# Patient Record
Sex: MALE | Race: WHITE | URBAN - NONMETROPOLITAN AREA
[De-identification: names, ages, dates, MRNs, and addresses within clinical notes are randomized per-mention and may not be internally consistent; named-entity substitution may affect disease eponyms.]

---

## 2018-09-04 VITALS
HEIGHT: 73 IN | DIASTOLIC BLOOD PRESSURE: 68 MMHG | SYSTOLIC BLOOD PRESSURE: 100 MMHG | HEART RATE: 84 BPM | BODY MASS INDEX: 32.87 KG/M2 | WEIGHT: 248 LBS

## 2018-09-04 PROBLEM — J45.909 ASTHMA: Status: ACTIVE | Noted: 2018-09-04

## 2018-09-04 RX ORDER — FLUTICASONE PROPIONATE 50 MCG
1 SPRAY, SUSPENSION (ML) NASAL DAILY
COMMUNITY
End: 2021-12-23 | Stop reason: SDUPTHER

## 2018-09-04 RX ORDER — BUDESONIDE AND FORMOTEROL FUMARATE DIHYDRATE 160; 4.5 UG/1; UG/1
2 AEROSOL RESPIRATORY (INHALATION) 2 TIMES DAILY
COMMUNITY
End: 2021-12-23

## 2018-09-05 ENCOUNTER — OFFICE VISIT (OUTPATIENT)
Dept: FAMILY MEDICINE CLINIC | Age: 33
End: 2018-09-05

## 2018-09-05 VITALS
SYSTOLIC BLOOD PRESSURE: 128 MMHG | WEIGHT: 268.19 LBS | HEART RATE: 90 BPM | DIASTOLIC BLOOD PRESSURE: 76 MMHG | OXYGEN SATURATION: 97 % | BODY MASS INDEX: 36.37 KG/M2

## 2018-09-05 DIAGNOSIS — Z13.1 ENCOUNTER FOR SCREENING EXAMINATION FOR IMPAIRED GLUCOSE REGULATION AND DIABETES MELLITUS: ICD-10-CM

## 2018-09-05 DIAGNOSIS — Z23 NEED FOR PNEUMOCOCCAL VACCINE: ICD-10-CM

## 2018-09-05 DIAGNOSIS — J45.41 MODERATE PERSISTENT ASTHMA WITH ACUTE EXACERBATION: Primary | ICD-10-CM

## 2018-09-05 DIAGNOSIS — Z23 NEED FOR TDAP VACCINATION: ICD-10-CM

## 2018-09-05 DIAGNOSIS — E66.09 CLASS 2 OBESITY DUE TO EXCESS CALORIES WITHOUT SERIOUS COMORBIDITY WITH BODY MASS INDEX (BMI) OF 36.0 TO 36.9 IN ADULT: ICD-10-CM

## 2018-09-05 DIAGNOSIS — Z23 NEED FOR INFLUENZA VACCINATION: ICD-10-CM

## 2018-09-05 DIAGNOSIS — Z13.220 SCREENING, LIPID: ICD-10-CM

## 2018-09-05 PROCEDURE — 90732 PPSV23 VACC 2 YRS+ SUBQ/IM: CPT | Performed by: FAMILY MEDICINE

## 2018-09-05 PROCEDURE — 90686 IIV4 VACC NO PRSV 0.5 ML IM: CPT | Performed by: FAMILY MEDICINE

## 2018-09-05 PROCEDURE — 99213 OFFICE O/P EST LOW 20 MIN: CPT | Performed by: FAMILY MEDICINE

## 2018-09-05 PROCEDURE — 90715 TDAP VACCINE 7 YRS/> IM: CPT | Performed by: FAMILY MEDICINE

## 2018-09-05 PROCEDURE — 90472 IMMUNIZATION ADMIN EACH ADD: CPT | Performed by: FAMILY MEDICINE

## 2018-09-05 PROCEDURE — 90471 IMMUNIZATION ADMIN: CPT | Performed by: FAMILY MEDICINE

## 2018-09-05 RX ORDER — BUDESONIDE AND FORMOTEROL FUMARATE DIHYDRATE 160; 4.5 UG/1; UG/1
2 AEROSOL RESPIRATORY (INHALATION) 2 TIMES DAILY
Qty: 1 INHALER | Refills: 3 | Status: SHIPPED | OUTPATIENT
Start: 2018-09-05 | End: 2022-04-07

## 2018-09-05 RX ORDER — ALBUTEROL SULFATE 90 UG/1
2 AEROSOL, METERED RESPIRATORY (INHALATION) EVERY 6 HOURS PRN
Qty: 1 INHALER | Refills: 3 | Status: SHIPPED | OUTPATIENT
Start: 2018-09-05 | End: 2021-12-23 | Stop reason: SDUPTHER

## 2018-09-05 ASSESSMENT — ENCOUNTER SYMPTOMS
RHINORRHEA: 0
SORE THROAT: 0
DIARRHEA: 0
VOMITING: 0
WHEEZING: 1
BACK PAIN: 0
SHORTNESS OF BREATH: 0
CONSTIPATION: 0
FACIAL SWELLING: 0
COUGH: 0
CHEST TIGHTNESS: 1
COUGH: 1
ABDOMINAL PAIN: 0
NAUSEA: 0

## 2018-09-05 ASSESSMENT — PATIENT HEALTH QUESTIONNAIRE - PHQ9
SUM OF ALL RESPONSES TO PHQ QUESTIONS 1-9: 0
SUM OF ALL RESPONSES TO PHQ9 QUESTIONS 1 & 2: 0
1. LITTLE INTEREST OR PLEASURE IN DOING THINGS: 0
SUM OF ALL RESPONSES TO PHQ QUESTIONS 1-9: 0
2. FEELING DOWN, DEPRESSED OR HOPELESS: 0

## 2018-09-06 LAB
CHOLESTEROL/HDL RATIO: 3.9 RATIO
CHOLESTEROL: 190 MG/DL
GLUCOSE: 95 MG/DL
HDL, DIRECT: 49 MG/DL
LDL CHOLESTEROL CALCULATED: 77.4 MG/DL
TRIGL SERPL-MCNC: 318 MG/DL
VLDLC SERPL CALC-MCNC: 64 MG/DL

## 2021-06-22 NOTE — PROGRESS NOTES
[address] [place] [address] [address]  Dept: [phone]  Dept Fax: [phone]    Kay Lucero is a 35 y.o. male who presents today for his medical conditions/complaints as noted below.   Kay Lucero is c/o of Annual Exam (astma medication refills)      HPI:     HPI    BP Readings from Last 3 Encounters:   09/05/18 128/76   01/05/15 100/68   02/19/16 151/74          (goal 120/80)    Wt Readings from Last 3 Encounters:   09/05/18 268 lb 3 oz (121.6 kg)   01/05/15 248 lb (112.5 kg)   02/19/16 238 lb (108 kg)        Past Medical History:   Diagnosis Date    Asthma     GERD (gastroesophageal reflux disease)     Headache       Past Surgical History:   Procedure Laterality Date    HERNIA REPAIR      inguinal       Family History   Problem Relation Age of Onset    Asthma Mother     Asthma Father     Cancer Father         lung       Social History   Substance Use Topics    Smoking status: Former Smoker     Years: 11.00     Types: Cigarettes     Quit date: 2/1/2013    Smokeless tobacco: Never Used    Alcohol use Yes      Comment: occasionally      Current Outpatient Prescriptions   Medication Sig Dispense Refill    budesonide-formoterol (SYMBICORT) 160-4.5 MCG/ACT AERO Inhale 2 puffs into the lungs 2 times daily 1 Inhaler 3    albuterol sulfate HFA (PROAIR HFA) 108 (90 Base) MCG/ACT inhaler Inhale 2 puffs into the lungs every 6 hours as needed for Wheezing 1 Inhaler 3    fluticasone (FLONASE) 50 MCG/ACT nasal spray 1 spray by Each Nare route daily      budesonide-formoterol (SYMBICORT) 160-4.5 MCG/ACT AERO Inhale 2 puffs into the lungs 2 times daily      sucralfate (CARAFATE) 1 GM/10ML suspension Take 10 mLs by mouth 4 times daily 1200 mL 3    albuterol sulfate HFA (PROVENTIL HFA) 108 (90 BASE) MCG/ACT inhaler Inhale 1-2 puffs into the lungs every 4 hours as needed for Wheezing or Shortness of Breath (Space out to every 6 hours as
History:   Diagnosis Date    Asthma     GERD (gastroesophageal reflux disease)     Headache       Past Surgical History:   Procedure Laterality Date    HERNIA REPAIR      inguinal       Family History   Problem Relation Age of Onset    Asthma Mother     Asthma Father     Cancer Father         lung       Social History   Substance Use Topics    Smoking status: Former Smoker     Years: 11.00     Types: Cigarettes     Quit date: 2/1/2013    Smokeless tobacco: Never Used    Alcohol use Yes      Comment: occasionally      Current Outpatient Prescriptions   Medication Sig Dispense Refill    budesonide-formoterol (SYMBICORT) 160-4.5 MCG/ACT AERO Inhale 2 puffs into the lungs 2 times daily 1 Inhaler 3    albuterol sulfate HFA (PROAIR HFA) 108 (90 Base) MCG/ACT inhaler Inhale 2 puffs into the lungs every 6 hours as needed for Wheezing 1 Inhaler 3    fluticasone (FLONASE) 50 MCG/ACT nasal spray 1 spray by Each Nare route daily      budesonide-formoterol (SYMBICORT) 160-4.5 MCG/ACT AERO Inhale 2 puffs into the lungs 2 times daily      sucralfate (CARAFATE) 1 GM/10ML suspension Take 10 mLs by mouth 4 times daily 1200 mL 3    albuterol sulfate HFA (PROVENTIL HFA) 108 (90 BASE) MCG/ACT inhaler Inhale 1-2 puffs into the lungs every 4 hours as needed for Wheezing or Shortness of Breath (Space out to every 6 hours as symptoms improve) Space out to every 6 hours as symptoms improve. 1 Inhaler 0     No current facility-administered medications for this visit. No Known Allergies    Health Maintenance   Topic Date Due    HIV screen  04/16/2000    DTaP/Tdap/Td vaccine (2 - Td) 09/05/2028    Flu vaccine  Completed    Pneumococcal med risk  Completed       Subjective:      Review of Systems   Constitutional: Positive for fatigue. Negative for appetite change, chills and fever. HENT: Negative for ear pain, facial swelling, hearing loss, rhinorrhea, sneezing and sore throat.     Respiratory: Positive for chest
for Wheezing or Shortness of Breath (Space out to every 6 hours as symptoms improve) Space out to every 6 hours as symptoms improve. 1 Inhaler 0     No current facility-administered medications for this visit. No Known Allergies    Health Maintenance   Topic Date Due    HIV screen  04/16/2000    DTaP/Tdap/Td vaccine (1 - Tdap) 04/16/2004    Pneumococcal med risk (1 of 1 - PPSV23) 04/16/2004    Flu vaccine (1) 09/01/2018       Subjective:      Review of Systems   Respiratory: Positive for cough and wheezing (always). Respiratory: Positive for wheezing ( little bit). Negative for cough and shortness of breath. Cardiovascular: Negative for chest pain, palpitations and leg swelling. Gastrointestinal: Negative for abdominal pain, constipation and diarrhea. Genitourinary: Negative for frequency and urgency. Neurological: Negative for dizziness and headaches. Objective:     /76   Pulse 90   Wt 268 lb 3 oz (121.6 kg)   SpO2 97%   BMI 36.37 kg/m²     Physical Exam    Assessment/Plan:     {No diagnosis found. (Refresh or delete this SmartLink)}      No results found for: WBC, HGB, HCT, PLT, CHOL, TRIG, HDL, LDLDIRECT, ALT, AST, NA, K, CL, CREATININE, BUN, CO2, TSH, PSA, INR, GLUF, LABA1C, LABMICR    No Follow-up on file. Patient given educational materials - see patient instructions. Discussed use, benefit, and side effects of prescribed medications. All patient questions answered. Pt voiced understanding. Reviewed health maintenance. Instructed to continue current medications, diet and exercise. Patient agreed with treatment plan. Follow up as directed.      Electronically signed by Esperanza Montelongo MD on 9/5/2018
There are no Wet Read(s) to document.

## 2021-12-23 ENCOUNTER — E-VISIT (OUTPATIENT)
Dept: OTHER | Facility: CLINIC | Age: 36
End: 2021-12-23

## 2021-12-23 ENCOUNTER — E-VISIT (OUTPATIENT)
Dept: OTHER | Facility: CLINIC | Age: 36
End: 2021-12-23
Payer: COMMERCIAL

## 2021-12-23 DIAGNOSIS — J45.41 MODERATE PERSISTENT ASTHMA WITH ACUTE EXACERBATION: ICD-10-CM

## 2021-12-23 DIAGNOSIS — J30.1 SEASONAL ALLERGIC RHINITIS DUE TO POLLEN: Primary | ICD-10-CM

## 2021-12-23 PROCEDURE — 99421 OL DIG E/M SVC 5-10 MIN: CPT | Performed by: FAMILY MEDICINE

## 2021-12-23 RX ORDER — ALBUTEROL SULFATE 90 UG/1
2 AEROSOL, METERED RESPIRATORY (INHALATION) EVERY 6 HOURS PRN
Qty: 1 EACH | Refills: 5 | Status: SHIPPED | OUTPATIENT
Start: 2021-12-23 | End: 2022-04-02 | Stop reason: ALTCHOICE

## 2021-12-23 RX ORDER — FLUTICASONE PROPIONATE 50 MCG
1 SPRAY, SUSPENSION (ML) NASAL DAILY
Qty: 16 G | Refills: 5 | Status: SHIPPED | OUTPATIENT
Start: 2021-12-23

## 2022-04-01 RX ORDER — ALBUTEROL SULFATE 90 UG/1
2 AEROSOL, METERED RESPIRATORY (INHALATION) EVERY 6 HOURS PRN
Qty: 1 EACH | Refills: 5 | Status: CANCELLED | OUTPATIENT
Start: 2022-04-01

## 2022-04-01 RX ORDER — FLUTICASONE PROPIONATE 50 MCG
1 SPRAY, SUSPENSION (ML) NASAL DAILY
Qty: 16 G | Refills: 5 | Status: CANCELLED | OUTPATIENT
Start: 2022-04-01

## 2022-04-02 DIAGNOSIS — J45.20 MILD INTERMITTENT ASTHMA WITHOUT COMPLICATION: Primary | ICD-10-CM

## 2022-04-07 ENCOUNTER — OFFICE VISIT (OUTPATIENT)
Dept: FAMILY MEDICINE CLINIC | Age: 37
End: 2022-04-07
Payer: COMMERCIAL

## 2022-04-07 VITALS
DIASTOLIC BLOOD PRESSURE: 72 MMHG | HEART RATE: 88 BPM | BODY MASS INDEX: 37.72 KG/M2 | SYSTOLIC BLOOD PRESSURE: 112 MMHG | OXYGEN SATURATION: 97 % | WEIGHT: 269.4 LBS | HEIGHT: 71 IN | RESPIRATION RATE: 20 BRPM

## 2022-04-07 DIAGNOSIS — L85.3 DRY SKIN DERMATITIS: ICD-10-CM

## 2022-04-07 DIAGNOSIS — K52.9 ACUTE GASTROENTERITIS: ICD-10-CM

## 2022-04-07 DIAGNOSIS — J45.41 MODERATE PERSISTENT ASTHMA WITH ACUTE EXACERBATION: Primary | ICD-10-CM

## 2022-04-07 PROCEDURE — 99214 OFFICE O/P EST MOD 30 MIN: CPT | Performed by: FAMILY MEDICINE

## 2022-04-07 RX ORDER — BUDESONIDE AND FORMOTEROL FUMARATE DIHYDRATE 160; 4.5 UG/1; UG/1
2 AEROSOL RESPIRATORY (INHALATION) 2 TIMES DAILY
Qty: 1 EACH | Refills: 5 | Status: SHIPPED | OUTPATIENT
Start: 2022-04-07

## 2022-04-07 RX ORDER — AZITHROMYCIN 500 MG/1
500 TABLET, FILM COATED ORAL DAILY
Qty: 3 TABLET | Refills: 0 | Status: SHIPPED | OUTPATIENT
Start: 2022-04-07 | End: 2022-04-10

## 2022-04-07 SDOH — ECONOMIC STABILITY: FOOD INSECURITY: WITHIN THE PAST 12 MONTHS, THE FOOD YOU BOUGHT JUST DIDN'T LAST AND YOU DIDN'T HAVE MONEY TO GET MORE.: NEVER TRUE

## 2022-04-07 SDOH — ECONOMIC STABILITY: FOOD INSECURITY: WITHIN THE PAST 12 MONTHS, YOU WORRIED THAT YOUR FOOD WOULD RUN OUT BEFORE YOU GOT MONEY TO BUY MORE.: NEVER TRUE

## 2022-04-07 ASSESSMENT — ENCOUNTER SYMPTOMS
DIARRHEA: 1
BLOATING: 1
ABDOMINAL PAIN: 0
VOMITING: 0
FLATUS: 1
COUGH: 0

## 2022-04-07 ASSESSMENT — PATIENT HEALTH QUESTIONNAIRE - PHQ9
4. FEELING TIRED OR HAVING LITTLE ENERGY: 0
9. THOUGHTS THAT YOU WOULD BE BETTER OFF DEAD, OR OF HURTING YOURSELF: 0
SUM OF ALL RESPONSES TO PHQ QUESTIONS 1-9: 5
8. MOVING OR SPEAKING SO SLOWLY THAT OTHER PEOPLE COULD HAVE NOTICED. OR THE OPPOSITE, BEING SO FIGETY OR RESTLESS THAT YOU HAVE BEEN MOVING AROUND A LOT MORE THAN USUAL: 1
2. FEELING DOWN, DEPRESSED OR HOPELESS: 2
5. POOR APPETITE OR OVEREATING: 0
6. FEELING BAD ABOUT YOURSELF - OR THAT YOU ARE A FAILURE OR HAVE LET YOURSELF OR YOUR FAMILY DOWN: 0
3. TROUBLE FALLING OR STAYING ASLEEP: 0
SUM OF ALL RESPONSES TO PHQ QUESTIONS 1-9: 5
10. IF YOU CHECKED OFF ANY PROBLEMS, HOW DIFFICULT HAVE THESE PROBLEMS MADE IT FOR YOU TO DO YOUR WORK, TAKE CARE OF THINGS AT HOME, OR GET ALONG WITH OTHER PEOPLE: 0
1. LITTLE INTEREST OR PLEASURE IN DOING THINGS: 2
7. TROUBLE CONCENTRATING ON THINGS, SUCH AS READING THE NEWSPAPER OR WATCHING TELEVISION: 0
SUM OF ALL RESPONSES TO PHQ9 QUESTIONS 1 & 2: 4
SUM OF ALL RESPONSES TO PHQ QUESTIONS 1-9: 5
SUM OF ALL RESPONSES TO PHQ QUESTIONS 1-9: 5

## 2022-04-07 ASSESSMENT — SOCIAL DETERMINANTS OF HEALTH (SDOH): HOW HARD IS IT FOR YOU TO PAY FOR THE VERY BASICS LIKE FOOD, HOUSING, MEDICAL CARE, AND HEATING?: NOT HARD AT ALL

## 2022-04-07 NOTE — PATIENT INSTRUCTIONS
Can try the fexofenadine instead of the loratadine, cetirizine if needed for an antihistamine for allergy symptoms. Start on a daily probiotic and pepto bismal as needed for the loose stools. Give this over the weekend and if not improving then I would do a short course of an antibiotic that targets the number one causes of bacterial GI infections. May try the probiotic such as Idell Sears Quadra 106, or Florastor or similar multi-strain probiotic blend for improving the GI symptoms.

## 2022-04-07 NOTE — PROGRESS NOTES
[address] [facility]  [address]  Dept: [phone]  Dept [facility]:[phone]    Neha Pennington is a 39 y.o. male who presents today for his medical conditions/complaints as notedbelow. Neha Pennington is c/o of Rash (Rough skin with taking Claritin/Zyrtec. He stopped taking medication and skin cleared. Diarrhea for 3 weeks. 8-15 loose (looks like mud) stools per day. He has had 5 today so far. He is a . )      HPI:     Started with URI/ Sinus thing then went into a stomach thing with the diarrhea. Only has taken the antidiarrhea medications. Thought related maybe to poor diet but has really tried to eat better and has not made any difference. No significant weight change. 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 Not really doing any restaurant food if he can help it. Is drinking mostly water to drink. No pepsi at all in the last few years. Diarrhea   This is a new problem. The current episode started 1 to 4 weeks ago (3 weeks ago). The problem occurs 5 to 10 times per day. The stool consistency is described as mucous (looks like mud). Associated symptoms include bloating, increased flatus and a URI. Pertinent negatives include no abdominal pain, arthralgias, chills, coughing, fever, headaches, myalgias, sweats, vomiting or weight loss. Nothing (no change with what he eats ro drinks, stopped dairy and no change, stopped fast food.  ) aggravates the symptoms. There are no known risk factors. He has tried anti-motility drug and bismuth subsalicylate for the symptoms. The treatment provided mild relief. Has continued to use an albuterol inhaler monthly since he had COVID about  Months ago. Has had persistent problems since that time. Cough has also resolved with the nasal steroid and the saline. Is hearing the wheezing several times per day and then he knows he needs his inhaler.   No cough or mucous production since he has been taking his flonse. No CP, has the SOB. The inhaler takes the SOB away. Has stopped the vaping completely in the last 4 years. Was starting to get some red bumps on the sides of his abd when he was using the antihistamine. Would have significant episodes of skin feeling like it is on fire. On the back of his arms, on the backs of his legs and along the sides. BP Readings from Last 3 Encounters:   22 112/72   18 128/76   01/05/15 100/68          (goal 120/80)    Wt Readings from Last 3 Encounters:   22 269 lb 6.4 oz (122.2 kg)   18 268 lb 3 oz (121.6 kg)   01/05/15 248 lb (112.5 kg)        Past Medical History:   Diagnosis Date    Asthma     GERD (gastroesophageal reflux disease)     Headache       Past Surgical History:   Procedure Laterality Date    HERNIA REPAIR      inguinal       Family History   Problem Relation Age of Onset    Asthma Mother     Asthma Father     Cancer Father         lung       Social History     Tobacco Use    Smoking status: Former Smoker     Years: 11.00     Types: Cigarettes     Quit date: 2013     Years since quittin.1    Smokeless tobacco: Never Used   Substance Use Topics    Alcohol use: Yes     Comment: occasionally      Current Outpatient Medications   Medication Sig Dispense Refill    budesonide-formoterol (SYMBICORT) 160-4.5 MCG/ACT AERO Inhale 2 puffs into the lungs 2 times daily 1 each 5    azithromycin (ZITHROMAX) 500 MG tablet Take 1 tablet by mouth daily for 3 days 3 tablet 0    VENTOLIN  (90 Base) MCG/ACT inhaler Inhale 2 puffs into the lungs every 4 hours as needed for Wheezing or Shortness of Breath 18 g 5    fluticasone (FLONASE) 50 MCG/ACT nasal spray 1 spray by Each Nostril route daily 16 g 5     No current facility-administered medications for this visit.      Allergies   Allergen Reactions    Claritin [Loratadine] Rash    Zyrtec [Cetirizine] Rash       Health Maintenance   Topic Date Due    Hepatitis C screen  Never done    Varicella vaccine (1 of 2 - 2-dose childhood series) Never done    COVID-19 Vaccine (1) Never done    HIV screen  Never done    Diabetes screen  Never done    Flu vaccine (Season Ended) 09/01/2022    Depression Screen  04/07/2023    DTaP/Tdap/Td vaccine (2 - Td or Tdap) 09/05/2028    Pneumococcal 0-64 years Vaccine (2 of 2 - PPSV23) 04/16/2050    Hepatitis A vaccine  Aged Out    Hepatitis B vaccine  Aged Out    Hib vaccine  Aged Out    Meningococcal (ACWY) vaccine  Aged Out       Subjective:      Review of Systems   Constitutional: Negative for chills, fever and weight loss. Respiratory: Negative for cough. Gastrointestinal: Positive for bloating, diarrhea and flatus. Negative for abdominal pain and vomiting. Musculoskeletal: Negative for arthralgias and myalgias. Neurological: Negative for headaches. Objective:     /72 (Site: Right Upper Arm, Position: Sitting, Cuff Size: Large Adult)   Pulse 88   Resp 20   Ht 5' 11.46\" (1.815 m)   Wt 269 lb 6.4 oz (122.2 kg)   SpO2 97%   BMI 37.09 kg/m²     Physical Exam  Vitals and nursing note reviewed. Constitutional:       Appearance: Normal appearance. He is well-developed. HENT:      Head: Normocephalic and atraumatic. Eyes:      Conjunctiva/sclera: Conjunctivae normal.   Neck:      Thyroid: No thyromegaly. Vascular: No JVD. Cardiovascular:      Rate and Rhythm: Normal rate and regular rhythm. Heart sounds: Normal heart sounds. No murmur heard. No friction rub. No gallop. Pulmonary:      Effort: Pulmonary effort is normal. No respiratory distress. Breath sounds: Normal breath sounds. No wheezing or rales. Abdominal:      Palpations: Abdomen is soft. There is no mass. Tenderness: There is no abdominal tenderness. There is no right CVA tenderness or left CVA tenderness. Musculoskeletal:      Cervical back: Normal range of motion and neck supple.       Right lower leg: No edema. Left lower leg: No edema. Lymphadenopathy:      Cervical: No cervical adenopathy. Skin:     General: Skin is warm. Findings: Rash (Diffuse rough areas on the abdomen and along the posterior flanks. Some roughness in the extensor surfaces of the arm as well. No discrete rash.) present. Neurological:      Mental Status: He is alert and oriented to person, place, and time. Mental status is at baseline. Psychiatric:         Mood and Affect: Mood normal.         Behavior: Behavior normal.         Thought Content: Thought content normal.         Judgment: Judgment normal.         Assessment/Plan:     1. Moderate persistent asthma with acute exacerbation  -     budesonide-formoterol (SYMBICORT) 160-4.5 MCG/ACT AERO; Inhale 2 puffs into the lungs 2 times daily, Disp-1 each, R-5Normal  2. Acute gastroenteritis  -     azithromycin (ZITHROMAX) 500 MG tablet; Take 1 tablet by mouth daily for 3 days, Disp-3 tablet, R-0Print  3. Dry skin dermatitis    Can try the fexofenadine instead of the loratadine, cetirizine if needed for an antihistamine for allergy symptoms. Make sure using a moisturizing body wash. Use daily moisturizer out of the shower. May have had an allergic type reaction to the antihistamine but may simply have exacerbated his already dry skin. Start on a daily probiotic and pepto bismal as needed for the loose stools. Give this over the weekend and if not improving then I would do a short course of an antibiotic that targets the number one causes of bacterial GI infections. May try the probiotic such as Malik Ditia Quadra 106, or Florastor or similar multi-strain probiotic blend for improving the GI symptoms. Lab Results   Component Value Date    CHOL 190 09/06/2018    TRIG 318 (H) 09/06/2018       Return in about 6 months (around 10/7/2022) for Medication recheck. --Follow-up sooner if rash recurs or if new symptoms related to his asthma.       Multiple labs and other testing may have been ordered which may not be completely evident from the above note due to system interface incompatibilities. Patient given educational materials - see patientinstructions. Discussed use, benefit, and side effects of prescribed medications. All patient questions answered. Pt voiced understanding. Reviewed health maintenance. Instructed to continue current medications, diet andexercise. Patient agreed with treatment plan. Follow up as directed.      (Please note that portions of this note were completed with a voice-recognition program. Efforts were made to edit the dictation but occasionally words are mis-transcribed.)    Electronically signed by Edson Baeza MD on 4/10/2022

## 2022-04-18 ENCOUNTER — TELEPHONE (OUTPATIENT)
Dept: FAMILY MEDICINE CLINIC | Age: 37
End: 2022-04-18

## 2022-04-18 DIAGNOSIS — A09 INFECTIOUS GASTROENTERITIS: Primary | ICD-10-CM

## 2022-04-18 RX ORDER — AZITHROMYCIN 500 MG/1
500 TABLET, FILM COATED ORAL DAILY
Qty: 3 TABLET | Refills: 0 | Status: SHIPPED | OUTPATIENT
Start: 2022-04-18 | End: 2022-04-21

## 2022-04-18 NOTE — TELEPHONE ENCOUNTER
Pt notified by phone, states he was given a written script for abx at his last appt and he did end up getting that filled and did that for 3 days. He is not going as often but still very watery when does go. He is still taking the probiotic daily and taking pepto bismol daily.  Notified if things worsen or change to let us know for further testing

## 2022-04-18 NOTE — TELEPHONE ENCOUNTER
Yazan Veliz MD 3 days ago       I wanted to message you about francisco . He is now having mucous in his diarrhea a few times a day and Im getting worried with how much he is going to the bathroom. If by Monday he isnt seeing improvement can you order some tests or something so we can figure out what is going on? Im really worried about him.

## 2022-04-18 NOTE — TELEPHONE ENCOUNTER
At our last visit we discussed his ongoing diarrhea for several weeks. We discussed that if his diarrhea was not getting better at that point after about 5 to 6 days that he was to let us know and we would do an empiric treatment for infectious gastroenteritis with an antibiotic. I have not heard from him until the message that came into his wife's chart. I will send in a prescription for the azithromycin as we discussed this is a 3-day course. Give out at 4-week to see improvement. The mucus itself does not alarm me but blood work. She did see a gradual decrease in the number stools. Continue taking the probiotic daily. If he continues to have many watery stools daily and then I would recommend stool studies and we would proceed from there. I would also recommend an abdominal x-ray at that time. It may take several weeks for his bowels to get back to a normal pattern.

## 2022-05-09 ENCOUNTER — OFFICE VISIT (OUTPATIENT)
Dept: FAMILY MEDICINE CLINIC | Age: 37
End: 2022-05-09
Payer: COMMERCIAL

## 2022-05-09 VITALS
DIASTOLIC BLOOD PRESSURE: 86 MMHG | BODY MASS INDEX: 37.66 KG/M2 | WEIGHT: 269 LBS | HEIGHT: 71 IN | OXYGEN SATURATION: 97 % | SYSTOLIC BLOOD PRESSURE: 120 MMHG | HEART RATE: 86 BPM

## 2022-05-09 DIAGNOSIS — A09 INFECTIOUS GASTROENTERITIS: Primary | ICD-10-CM

## 2022-05-09 PROCEDURE — 99213 OFFICE O/P EST LOW 20 MIN: CPT | Performed by: FAMILY MEDICINE

## 2022-05-09 NOTE — PROGRESS NOTES
[address] [facility]  [address]  Dept: [phone]  Dept [facility]:[phone]    Serenity Flores is a 40 y.o. male who presents today for his medical conditions/complaints as notedbelow. Serenity Flores is c/o of Diarrhea (1 mo f/u)      HPI:     TREASURE [place] started having loose stools in mid March. So sated at that time with a URI. He was treated with probiotics and then a short course of azithromycin for presumed infectious diarrhea. He is better-- Is using the bathroom 3-4 times per day rather than 10 like he was initially. Consistency is still soft between a 5-6 on the stool scale-- no tenderness in the colon any more. Still has some urgency. But not nearly as frequent. Has tried increasing the fiber in his diet, sweet potatoes, wheat bread. If he keeps his calories lower he does better. Has cut out most fast food a few years ago.       BP Readings from Last 3 Encounters:   22 120/86   22 112/72   18 128/76          (goal 120/80)    Wt Readings from Last 3 Encounters:   22 269 lb (122 kg)   22 269 lb 6.4 oz (122.2 kg)   18 268 lb 3 oz (121.6 kg)        Past Medical History:   Diagnosis Date    Asthma     GERD (gastroesophageal reflux disease)     Headache       Past Surgical History:   Procedure Laterality Date    HERNIA REPAIR      inguinal       Family History   Problem Relation Age of Onset    Asthma Mother     Asthma Father     Cancer Father         lung       Social History     Tobacco Use    Smoking status: Former Smoker     Years: 11.00     Types: Cigarettes     Quit date: 2013     Years since quittin.2    Smokeless tobacco: Never Used   Substance Use Topics    Alcohol use: Yes     Comment: occasionally      Current Outpatient Medications   Medication Sig Dispense Refill    Probiotic Product ("[employer] PO) Take 1 capsule by mouth daily      Probiotic Product (PROBIOTIC-10 PO) Take 1 capsule by mouth daily      budesonide-formoterol (SYMBICORT) 160-4.5 MCG/ACT AERO Inhale 2 puffs into the lungs 2 times daily 1 each 5    VENTOLIN  (90 Base) MCG/ACT inhaler Inhale 2 puffs into the lungs every 4 hours as needed for Wheezing or Shortness of Breath 18 g 5    fluticasone (FLONASE) 50 MCG/ACT nasal spray 1 spray by Each Nostril route daily 16 g 5     No current facility-administered medications for this visit. Allergies   Allergen Reactions    Claritin [Loratadine] Rash    Zyrtec [Cetirizine] Rash       Health Maintenance   Topic Date Due    Varicella vaccine (1 of 2 - 2-dose childhood series) Never done    COVID-19 Vaccine (1) Never done    HIV screen  Never done    Hepatitis C screen  Never done    Pneumococcal 0-64 years Vaccine (2 - PCV) 09/05/2019    Diabetes screen  Never done    Flu vaccine (Season Ended) 09/01/2022    Depression Screen  04/07/2023    DTaP/Tdap/Td vaccine (2 - Td or Tdap) 09/05/2028    Hepatitis A vaccine  Aged Out    Hepatitis B vaccine  Aged Out    Hib vaccine  Aged Out    Meningococcal (ACWY) vaccine  Aged Out       Subjective:      Review of Systems    Objective:     /86 (Site: Right Upper Arm, Position: Sitting, Cuff Size: Large Adult)   Pulse 86   Ht 5' 11\" (1.803 m)   Wt 269 lb (122 kg)   SpO2 97%   BMI 37.52 kg/m²     Physical Exam  Vitals and nursing note reviewed. Constitutional:       Appearance: Normal appearance. He is well-developed. HENT:      Head: Normocephalic and atraumatic. Eyes:      Conjunctiva/sclera: Conjunctivae normal.   Neck:      Thyroid: No thyromegaly. Vascular: No JVD. Cardiovascular:      Rate and Rhythm: Normal rate and regular rhythm. Heart sounds: Normal heart sounds. No murmur heard. No friction rub. No gallop. Pulmonary:      Effort: Pulmonary effort is normal. No respiratory distress. Breath sounds: Normal breath sounds.    Abdominal:      Palpations: Abdomen is soft. There is no mass. Tenderness: There is no abdominal tenderness. Musculoskeletal:      Cervical back: Normal range of motion and neck supple. Right lower leg: No edema. Left lower leg: No edema. Lymphadenopathy:      Cervical: No cervical adenopathy. Skin:     General: Skin is warm. Neurological:      General: No focal deficit present. Mental Status: He is alert and oriented to person, place, and time. Assessment/Plan:     1. Infectious gastroenteritis      Try holding the dairy for 2 weeks and see how this affects the bowels. Gradually add in the fiber supplement as well. If not improving with check the XR of the abd. and consider celiac titers if this is not helping then would consider adding in cholesteramine daily for the bowels. Start a psyllium supplement daily such as metamucil. Start slowly with a tablespoon of the regular once daily (1 tsp if you use the sugar free) and then in 2 weeks increase to 2 tbs and up to twice daily if needed to regulate. Lab Results   Component Value Date    CHOL 190 09/06/2018    TRIG 318 (H) 09/06/2018       Return if symptoms worsen or fail to improve. Multiple labs and other testing may have been ordered which may not be completely evident from the above note due to system interface incompatibilities. Patient given educational materials - see patientinstructions. Discussed use, benefit, and side effects of prescribed medications. All patient questions answered. Pt voiced understanding. Reviewed health maintenance. Instructed to continue current medications, diet andexercise. Patient agreed with treatment plan. Follow up as directed.      (Please note that portions of this note were completed with a voice-recognition program. Efforts were made to edit the dictation but occasionally words are mis-transcribed.)    Electronically signed by Jina Cardoso MD on 5/15/2022

## 2022-05-09 NOTE — PATIENT INSTRUCTIONS
Start a psyllium supplement daily such as metamucil.  Start slowly with a tablespoon of the regular once daily (1 tsp if you use the sugar free) and then in 2 weeks increase to 2 tbs and up to twice daily if needed to regulate

## 2023-03-21 DIAGNOSIS — J45.20 MILD INTERMITTENT ASTHMA WITHOUT COMPLICATION: ICD-10-CM

## 2023-03-22 DIAGNOSIS — J45.20 MILD INTERMITTENT ASTHMA WITHOUT COMPLICATION: ICD-10-CM

## 2023-03-22 DIAGNOSIS — J45.41 MODERATE PERSISTENT ASTHMA WITH ACUTE EXACERBATION: ICD-10-CM

## 2023-03-22 RX ORDER — ALBUTEROL SULFATE 90 UG/1
AEROSOL, METERED RESPIRATORY (INHALATION)
Qty: 8.5 G | Refills: 0 | Status: SHIPPED | OUTPATIENT
Start: 2023-03-22 | End: 2023-03-22 | Stop reason: SDUPTHER

## 2023-03-22 RX ORDER — ALBUTEROL SULFATE 90 UG/1
2 AEROSOL, METERED RESPIRATORY (INHALATION) EVERY 6 HOURS PRN
Qty: 8.5 G | Refills: 3 | Status: SHIPPED | OUTPATIENT
Start: 2023-03-22

## 2023-03-22 RX ORDER — ALBUTEROL SULFATE 90 UG/1
2 AEROSOL, METERED RESPIRATORY (INHALATION) EVERY 4 HOURS PRN
Qty: 18 G | Refills: 5 | OUTPATIENT
Start: 2023-03-22

## 2023-03-22 RX ORDER — BUDESONIDE AND FORMOTEROL FUMARATE DIHYDRATE 160; 4.5 UG/1; UG/1
2 AEROSOL RESPIRATORY (INHALATION) 2 TIMES DAILY
Qty: 1 EACH | Refills: 5 | Status: SHIPPED | OUTPATIENT
Start: 2023-03-22

## 2023-03-22 NOTE — TELEPHONE ENCOUNTER
Tanika Heath is requesting a refill on the following medication(s):  Requested Prescriptions     Pending Prescriptions Disp Refills    VENTOLIN  (90 Base) MCG/ACT inhaler 18 g 5     Sig: Inhale 2 puffs into the lungs every 4 hours as needed for Wheezing or Shortness of Breath       Last Visit Date (If Applicable):  4/7/9547    Next Visit Date:    Visit date not found

## 2023-03-22 NOTE — TELEPHONE ENCOUNTER
Alyssa Benavides is requesting a refill on the following medication(s):  Requested Prescriptions     Pending Prescriptions Disp Refills    budesonide-formoterol (SYMBICORT) 160-4.5 MCG/ACT AERO 1 each 5     Sig: Inhale 2 puffs into the lungs 2 times daily       Last Visit Date (If Applicable):  2/6/5011    Next Visit Date:    3/22/2023

## 2023-07-14 ENCOUNTER — OFFICE VISIT (OUTPATIENT)
Dept: FAMILY MEDICINE CLINIC | Age: 38
End: 2023-07-14
Payer: COMMERCIAL

## 2023-07-14 VITALS
DIASTOLIC BLOOD PRESSURE: 74 MMHG | SYSTOLIC BLOOD PRESSURE: 104 MMHG | HEIGHT: 71 IN | HEART RATE: 70 BPM | BODY MASS INDEX: 33.12 KG/M2 | OXYGEN SATURATION: 97 % | WEIGHT: 236.6 LBS

## 2023-07-14 DIAGNOSIS — Z13.220 ENCOUNTER FOR LIPID SCREENING FOR CARDIOVASCULAR DISEASE: ICD-10-CM

## 2023-07-14 DIAGNOSIS — Z13.6 ENCOUNTER FOR LIPID SCREENING FOR CARDIOVASCULAR DISEASE: ICD-10-CM

## 2023-07-14 DIAGNOSIS — J45.20 MILD INTERMITTENT ASTHMA WITHOUT COMPLICATION: ICD-10-CM

## 2023-07-14 DIAGNOSIS — J45.41 MODERATE PERSISTENT ASTHMA WITH ACUTE EXACERBATION: ICD-10-CM

## 2023-07-14 DIAGNOSIS — Z13.1 SCREENING FOR DIABETES MELLITUS: ICD-10-CM

## 2023-07-14 DIAGNOSIS — Z00.00 WELLNESS EXAMINATION: Primary | ICD-10-CM

## 2023-07-14 LAB
CHOLESTEROL/HDL RATIO: 3.78 RATIO (ref 0–4.5)
CHOLESTEROL: 185 MG/DL (ref 50–200)
GLUCOSE: 86 MG/DL (ref 75–110)
HDLC SERPL-MCNC: 49 MG/DL (ref 36–68)
LDL CHOLESTEROL CALCULATED: 109 MG/DL (ref 0–160)
TRIGL SERPL-MCNC: 135 MG/DL (ref 10–250)
VLDLC SERPL CALC-MCNC: 27 MG/DL (ref 0–50)

## 2023-07-14 PROCEDURE — 99395 PREV VISIT EST AGE 18-39: CPT | Performed by: FAMILY MEDICINE

## 2023-07-14 RX ORDER — ALBUTEROL SULFATE 90 UG/1
2 AEROSOL, METERED RESPIRATORY (INHALATION) EVERY 6 HOURS PRN
Qty: 8.5 G | Refills: 3 | OUTPATIENT
Start: 2023-07-14

## 2023-07-14 RX ORDER — ALBUTEROL SULFATE 90 UG/1
2 AEROSOL, METERED RESPIRATORY (INHALATION) EVERY 6 HOURS PRN
Qty: 8.5 G | Refills: 3 | Status: SHIPPED | OUTPATIENT
Start: 2023-07-14

## 2023-07-14 RX ORDER — ALBUTEROL SULFATE 90 UG/1
2 AEROSOL, METERED RESPIRATORY (INHALATION) EVERY 6 HOURS PRN
Qty: 8.5 G | Refills: 3 | Status: SHIPPED | OUTPATIENT
Start: 2023-07-14 | End: 2023-07-14 | Stop reason: SDUPTHER

## 2023-07-14 RX ORDER — BUDESONIDE AND FORMOTEROL FUMARATE DIHYDRATE 160; 4.5 UG/1; UG/1
2 AEROSOL RESPIRATORY (INHALATION) 2 TIMES DAILY
Qty: 1 EACH | Refills: 5 | Status: SHIPPED | OUTPATIENT
Start: 2023-07-14

## 2023-07-14 ASSESSMENT — PATIENT HEALTH QUESTIONNAIRE - PHQ9
SUM OF ALL RESPONSES TO PHQ QUESTIONS 1-9: 0
2. FEELING DOWN, DEPRESSED OR HOPELESS: 0
SUM OF ALL RESPONSES TO PHQ QUESTIONS 1-9: 0
SUM OF ALL RESPONSES TO PHQ QUESTIONS 1-9: 0
SUM OF ALL RESPONSES TO PHQ9 QUESTIONS 1 & 2: 0
SUM OF ALL RESPONSES TO PHQ QUESTIONS 1-9: 0
1. LITTLE INTEREST OR PLEASURE IN DOING THINGS: 0

## 2023-07-14 NOTE — TELEPHONE ENCOUNTER
Mireya Doan is calling to request a refill on the following medication(s):  Requested Prescriptions     Pending Prescriptions Disp Refills    budesonide-formoterol (SYMBICORT) 160-4.5 MCG/ACT AERO 1 each 5     Sig: Inhale 2 puffs into the lungs 2 times daily     Refused Prescriptions Disp Refills    albuterol sulfate HFA (PROVENTIL;VENTOLIN;PROAIR) 108 (90 Base) MCG/ACT inhaler 8.5 g 3     Sig: Inhale 2 puffs into the lungs every 6 hours as needed for Wheezing       Last Visit Date (If Applicable):  1/81/3114    Next Visit Date:    Visit date not found

## 2024-07-18 SDOH — ECONOMIC STABILITY: INCOME INSECURITY: HOW HARD IS IT FOR YOU TO PAY FOR THE VERY BASICS LIKE FOOD, HOUSING, MEDICAL CARE, AND HEATING?: NOT VERY HARD

## 2024-07-18 SDOH — ECONOMIC STABILITY: FOOD INSECURITY: WITHIN THE PAST 12 MONTHS, YOU WORRIED THAT YOUR FOOD WOULD RUN OUT BEFORE YOU GOT MONEY TO BUY MORE.: NEVER TRUE

## 2024-07-18 SDOH — ECONOMIC STABILITY: HOUSING INSECURITY
IN THE LAST 12 MONTHS, WAS THERE A TIME WHEN YOU DID NOT HAVE A STEADY PLACE TO SLEEP OR SLEPT IN A SHELTER (INCLUDING NOW)?: NO

## 2024-07-18 SDOH — ECONOMIC STABILITY: FOOD INSECURITY: WITHIN THE PAST 12 MONTHS, THE FOOD YOU BOUGHT JUST DIDN'T LAST AND YOU DIDN'T HAVE MONEY TO GET MORE.: NEVER TRUE

## 2024-07-18 SDOH — ECONOMIC STABILITY: TRANSPORTATION INSECURITY
IN THE PAST 12 MONTHS, HAS LACK OF TRANSPORTATION KEPT YOU FROM MEETINGS, WORK, OR FROM GETTING THINGS NEEDED FOR DAILY LIVING?: NO

## 2024-07-18 ASSESSMENT — PATIENT HEALTH QUESTIONNAIRE - PHQ9
1. LITTLE INTEREST OR PLEASURE IN DOING THINGS: SEVERAL DAYS
SUM OF ALL RESPONSES TO PHQ QUESTIONS 1-9: 2
2. FEELING DOWN, DEPRESSED OR HOPELESS: SEVERAL DAYS
2. FEELING DOWN, DEPRESSED OR HOPELESS: SEVERAL DAYS
SUM OF ALL RESPONSES TO PHQ9 QUESTIONS 1 & 2: 2
SUM OF ALL RESPONSES TO PHQ QUESTIONS 1-9: 2
1. LITTLE INTEREST OR PLEASURE IN DOING THINGS: SEVERAL DAYS
SUM OF ALL RESPONSES TO PHQ QUESTIONS 1-9: 2
SUM OF ALL RESPONSES TO PHQ9 QUESTIONS 1 & 2: 2
SUM OF ALL RESPONSES TO PHQ QUESTIONS 1-9: 2

## 2024-07-19 ENCOUNTER — OFFICE VISIT (OUTPATIENT)
Dept: FAMILY MEDICINE CLINIC | Age: 39
End: 2024-07-19

## 2024-07-19 VITALS
DIASTOLIC BLOOD PRESSURE: 78 MMHG | OXYGEN SATURATION: 99 % | SYSTOLIC BLOOD PRESSURE: 122 MMHG | BODY MASS INDEX: 31.78 KG/M2 | HEART RATE: 67 BPM | WEIGHT: 227 LBS | HEIGHT: 71 IN

## 2024-07-19 DIAGNOSIS — Z13.220 SCREENING, LIPID: ICD-10-CM

## 2024-07-19 DIAGNOSIS — Z23 NEED FOR PROPHYLACTIC VACCINATION AGAINST STREPTOCOCCUS PNEUMONIAE (PNEUMOCOCCUS): ICD-10-CM

## 2024-07-19 DIAGNOSIS — Z00.00 WELL ADULT EXAM: Primary | ICD-10-CM

## 2024-07-19 DIAGNOSIS — Z13.1 SCREENING FOR DIABETES MELLITUS: ICD-10-CM

## 2024-07-19 DIAGNOSIS — J45.20 MILD INTERMITTENT ASTHMA WITHOUT COMPLICATION: ICD-10-CM

## 2024-07-19 RX ORDER — FLUTICASONE PROPIONATE AND SALMETEROL 250; 50 UG/1; UG/1
1 POWDER RESPIRATORY (INHALATION) EVERY 12 HOURS
Qty: 60 EACH | Refills: 3 | Status: SHIPPED | OUTPATIENT
Start: 2024-07-19

## 2024-07-19 RX ORDER — BUDESONIDE AND FORMOTEROL FUMARATE DIHYDRATE 160; 4.5 UG/1; UG/1
2 AEROSOL RESPIRATORY (INHALATION) 2 TIMES DAILY
Qty: 1 EACH | Refills: 5 | Status: CANCELLED | OUTPATIENT
Start: 2024-07-19

## 2024-07-19 RX ORDER — ALBUTEROL SULFATE 90 UG/1
2 AEROSOL, METERED RESPIRATORY (INHALATION) EVERY 6 HOURS PRN
Qty: 8.5 G | Refills: 3 | Status: SHIPPED | OUTPATIENT
Start: 2024-07-19

## 2024-07-19 NOTE — PROGRESS NOTES
[place] [address] [address] [facility]  [place] [address]  Dept: [phone]  Dept Fax:[phone]    Mango Weinberg is a 39 y.o. male who presents today for his medical conditions/complaints as notedbelow.  Mango Weinberg is c/o of Annual Exam        Assessment/Plan:     1. Well adult exam  Well adult topics reviewed.     2. Mild intermittent asthma without complication  Change to the wixela for formulary.  Let us know if any concerns  - albuterol sulfate HFA (PROVENTIL;VENTOLIN;PROAIR) 108 (90 Base) MCG/ACT inhaler; Inhale 2 puffs into the lungs every 6 hours as needed for Wheezing  Dispense: 8.5 g; Refill: 3  - fluticasone-salmeterol (ADVAIR) 250-50 MCG/ACT AEPB diskus inhaler; Inhale 1 puff into the lungs in the morning and 1 puff in the evening.  Dispense: 60 each; Refill: 3  - Pneumococcal, PCV20, PREVNAR 20, (age 6w+), IM, PF    3. Screening, lipid    - Lipid Panel; Future    4. Screening for diabetes mellitus    - Glucose, Fasting; Future    5. Need for prophylactic vaccination against Streptococcus pneumoniae (pneumococcus)    - Pneumococcal, PCV20, PREVNAR 20, (age 6w+), IM, PF        Lab Results   Component Value Date    CHOL 185 07/14/2023    TRIG 135 07/14/2023    HDL 49 07/14/2023       Return in about 1 year (around 7/19/2025) for Well adult.      Subjective:      HPI:     HPI    Has been feeling well overall. Still trying to exercise regularly.  No other issues. Eating the \"carnivore diet\"    BP Readings from Last 3 Encounters:   07/19/24 122/78   07/14/23 104/74   05/09/22 120/86          (goal 120/80)    Wt Readings from Last 3 Encounters:   07/19/24 103 kg (227 lb)   07/14/23 107.3 kg (236 lb 9.6 oz)   05/09/22 122 kg (269 lb)        Past Medical History:   Diagnosis Date    Asthma     GERD (gastroesophageal reflux disease)     Headache       Past Surgical History:   Procedure Laterality Date    HERNIA REPAIR      inguinal       Family History

## 2024-07-19 NOTE — PROGRESS NOTES
After obtaining consent, and per orders of Dr. Kim Page, injection of Prevnar 20 given in Right deltoid by Julio C Thomas MA. Patient tolerated well.  Patient instructed to remain in clinic for 20 minutes afterwards, and to report any adverse reaction immediately.

## 2024-07-22 RX ORDER — FLUTICASONE PROPIONATE AND SALMETEROL 250; 50 UG/1; UG/1
1 POWDER RESPIRATORY (INHALATION) EVERY 12 HOURS
Qty: 60 EACH | Refills: 3 | Status: SHIPPED | OUTPATIENT
Start: 2024-07-22

## 2024-07-22 NOTE — TELEPHONE ENCOUNTER
Inhaler not covered by insurance.   Wixela Inhub, BREO ELLIPTA  are covered alternatives. Last office note states switch to wixela due to formulary. If ok please sign rx.

## 2025-04-03 SDOH — ECONOMIC STABILITY: FOOD INSECURITY: WITHIN THE PAST 12 MONTHS, YOU WORRIED THAT YOUR FOOD WOULD RUN OUT BEFORE YOU GOT MONEY TO BUY MORE.: NEVER TRUE

## 2025-04-03 SDOH — ECONOMIC STABILITY: INCOME INSECURITY: IN THE LAST 12 MONTHS, WAS THERE A TIME WHEN YOU WERE NOT ABLE TO PAY THE MORTGAGE OR RENT ON TIME?: NO

## 2025-04-03 SDOH — ECONOMIC STABILITY: FOOD INSECURITY: WITHIN THE PAST 12 MONTHS, THE FOOD YOU BOUGHT JUST DIDN'T LAST AND YOU DIDN'T HAVE MONEY TO GET MORE.: NEVER TRUE

## 2025-04-03 SDOH — ECONOMIC STABILITY: TRANSPORTATION INSECURITY
IN THE PAST 12 MONTHS, HAS THE LACK OF TRANSPORTATION KEPT YOU FROM MEDICAL APPOINTMENTS OR FROM GETTING MEDICATIONS?: NO

## 2025-04-04 ENCOUNTER — OFFICE VISIT (OUTPATIENT)
Dept: FAMILY MEDICINE CLINIC | Age: 40
End: 2025-04-04

## 2025-04-04 VITALS
SYSTOLIC BLOOD PRESSURE: 124 MMHG | OXYGEN SATURATION: 98 % | BODY MASS INDEX: 33.05 KG/M2 | DIASTOLIC BLOOD PRESSURE: 68 MMHG | WEIGHT: 237 LBS | HEART RATE: 72 BPM

## 2025-04-04 DIAGNOSIS — J45.20 MILD INTERMITTENT ASTHMA WITHOUT COMPLICATION: ICD-10-CM

## 2025-04-04 DIAGNOSIS — Z09 HOSPITAL DISCHARGE FOLLOW-UP: Primary | ICD-10-CM
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
## 2025-04-04 DIAGNOSIS — S22.088A OTHER CLOSED FRACTURE OF ELEVENTH THORACIC VERTEBRA, INITIAL ENCOUNTER (HCC): ICD-10-CM

## 2025-04-04 RX ORDER — ACETAMINOPHEN 500 MG
500 TABLET ORAL EVERY 6 HOURS PRN
COMMUNITY
Start: 2025-04-01 | End: 2025-05-01

## 2025-04-04 RX ORDER — METHOCARBAMOL 750 MG/1
750 TABLET, FILM COATED ORAL 4 TIMES DAILY
COMMUNITY
Start: 2025-04-01 | End: 2025-05-01

## 2025-04-04 RX ORDER — IBUPROFEN 800 MG/1
800 TABLET, FILM COATED ORAL EVERY 8 HOURS PRN
COMMUNITY
Start: 2025-04-01 | End: 2025-05-01

## 2025-04-04 RX ORDER — FLUTICASONE PROPIONATE AND SALMETEROL 250; 50 UG/1; UG/1
1 POWDER RESPIRATORY (INHALATION) EVERY 12 HOURS
Qty: 180 EACH | Refills: 3 | Status: SHIPPED | OUTPATIENT
Start: 2025-04-04

## 2025-04-04 ASSESSMENT — PATIENT HEALTH QUESTIONNAIRE - PHQ9
1. LITTLE INTEREST OR PLEASURE IN DOING THINGS: NOT AT ALL
SUM OF ALL RESPONSES TO PHQ QUESTIONS 1-9: 0
2. FEELING DOWN, DEPRESSED OR HOPELESS: NOT AT ALL
SUM OF ALL RESPONSES TO PHQ QUESTIONS 1-9: 0

## 2025-04-04 NOTE — PROGRESS NOTES
Post-Discharge Transitional Care Follow Up    Mango Weinberg   YOB: 1985    Date of Office Visit:  4/4/2025  Date of Hospital Admission: 3/29/25   Date of Hospital Discharge: 4/1/25    Care management risk score Rising risk (score 2-5) and Complex Care (Scores >=6): No Risk Score On File     Non face to face  following discharge, date last encounter closed (first attempt may have been earlier): *No documented post hospital discharge outreach found in the last 14 days     Call initiated 2 business days of discharge: *No response recorded in the last 14 days    Assessment & Plan  1. Severe foot sprain.  - The patient's foot exhibits significant swelling and discoloration, likely due to the compression of the wrap.  - The range of motion remains satisfactory. An MRI is not deemed necessary at this point.  - He is advised to continue using the compression boot for support and to consider using a compression sock to further reduce swelling. Instructions on how to properly wear a compression sock were provided.  - He is encouraged to maintain mobility by walking around during work breaks.    2. Back pain.  - The patient reports pain primarily in the middle of his back, where the injury occurred.  - He is currently taking ibuprofen and Tylenol for pain management and has not needed oxycodone since returning home. He is also using a muscle relaxer.  - He is advised to take ibuprofen with food to minimize gastrointestinal discomfort. If stomach issues arise, he can start taking over-the-counter famotidine twice daily.  - He is scheduled for x-rays on his back on 04/29/2025 to monitor healing and alignment.    3. Gastrointestinal discomfort.  - The patient reports occasional stomach discomfort, likely due to ibuprofen use.  - He is advised to take ibuprofen with food and can use over-the-counter famotidine twice daily if needed.  - He is encouraged to drink plenty of water and eat bread to help alleviate

## 2025-04-16 ENCOUNTER — OFFICE VISIT (OUTPATIENT)
Dept: FAMILY MEDICINE CLINIC | Age: 40
End: 2025-04-16
Payer: COMMERCIAL

## 2025-04-16 VITALS
OXYGEN SATURATION: 97 % | BODY MASS INDEX: 33.05 KG/M2 | HEART RATE: 78 BPM | WEIGHT: 237 LBS | SYSTOLIC BLOOD PRESSURE: 124 MMHG | DIASTOLIC BLOOD PRESSURE: 76 MMHG

## 2025-04-16 DIAGNOSIS — S93.492A SPRAIN OF ANTERIOR TALOFIBULAR LIGAMENT OF LEFT ANKLE, INITIAL ENCOUNTER: ICD-10-CM
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
## 2025-04-16 DIAGNOSIS — S22.088A OTHER CLOSED FRACTURE OF ELEVENTH THORACIC VERTEBRA, INITIAL ENCOUNTER (HCC): Primary | ICD-10-CM

## 2025-04-16 PROCEDURE — G2211 COMPLEX E/M VISIT ADD ON: HCPCS | Performed by: FAMILY MEDICINE

## 2025-04-16 PROCEDURE — G8427 DOCREV CUR MEDS BY ELIG CLIN: HCPCS | Performed by: FAMILY MEDICINE

## 2025-04-16 PROCEDURE — 99214 OFFICE O/P EST MOD 30 MIN: CPT | Performed by: FAMILY MEDICINE

## 2025-04-16 PROCEDURE — G8417 CALC BMI ABV UP PARAM F/U: HCPCS | Performed by: FAMILY MEDICINE

## 2025-04-16 PROCEDURE — 1036F TOBACCO NON-USER: CPT | Performed by: FAMILY MEDICINE
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
## 2025-04-23 ENCOUNTER — OFFICE VISIT (OUTPATIENT)
Dept: FAMILY MEDICINE CLINIC | Age: 40
End: 2025-04-23
Payer: COMMERCIAL

## 2025-04-23 VITALS
SYSTOLIC BLOOD PRESSURE: 124 MMHG | BODY MASS INDEX: 32.78 KG/M2 | OXYGEN SATURATION: 97 % | HEART RATE: 87 BPM | WEIGHT: 235 LBS | DIASTOLIC BLOOD PRESSURE: 72 MMHG
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
## 2025-04-23 DIAGNOSIS — S93.492A SPRAIN OF ANTERIOR TALOFIBULAR LIGAMENT OF LEFT ANKLE, INITIAL ENCOUNTER: Primary | ICD-10-CM

## 2025-04-23 PROCEDURE — G8427 DOCREV CUR MEDS BY ELIG CLIN: HCPCS | Performed by: FAMILY MEDICINE

## 2025-04-23 PROCEDURE — 99213 OFFICE O/P EST LOW 20 MIN: CPT | Performed by: FAMILY MEDICINE

## 2025-04-23 PROCEDURE — G8417 CALC BMI ABV UP PARAM F/U: HCPCS | Performed by: FAMILY MEDICINE

## 2025-04-23 PROCEDURE — G2211 COMPLEX E/M VISIT ADD ON: HCPCS | Performed by: FAMILY MEDICINE

## 2025-04-23 PROCEDURE — 1036F TOBACCO NON-USER: CPT | Performed by: FAMILY MEDICINE

## 2025-04-23 NOTE — PROGRESS NOTES
[place] [address] [address] [facility]  [place], [place] [address]  Dept: [phone]  Dept Fax:[phone]    Mango Weinberg is a 40 y.o. male who presents today for his medical conditions/complaints as notedbelow.        Assessment/Plan:     Assessment & Plan  1. Thoracic vertebral fracture.  - Incapacitated due to thoracic vertebral fracture since 03/29/2025, with initial diagnosis on 03/28/2025.  - Weekly consultations since 04/04/2025; functional activities of daily living are performed slowly with breaks and accommodations.  - Scheduled for follow-up with surgeon on 04/29/2025 for x-rays on the back with the neurologist; ensure surgeon documents any necessary extensions clearly and sends a copy for records.  - Subsequent appointment to be scheduled towards the end of May 2025 to ensure readiness to return to work by 06/01/2025.    2. Severe ankle sprain.  - Continues to experience swelling in the ankle, especially around the joint.  - Advised to continue wearing the compression sock, which is effective even when using the boot.  - Referral for physical therapy for the ankle provided; therapy should gradually ease back into full activities with the ankle.      Assessment & Plan     Results      Lab Results   Component Value Date    CHOL 185 07/14/2023    TRIG 135 07/14/2023    HDL 49 07/14/2023       No follow-ups on file.      Subjective:      HPI:     Mango Weinberg is c/o of Follow-up (Pt states that he is here for paper work to be filled out. Pt states that he is seeing improvement. Pt states that he is still doing PT and PT is wondering if patient is needing PT for his ankle along with his foot. )    History of Present Illness  The patient presents for evaluation of a thoracic vertebral fracture and severe ankle sprain.    He has been informed by his employer that he must achieve full recovery before resuming work, even in a limited capacity. Despite being capable

## 2025-05-14 ENCOUNTER — OFFICE VISIT (OUTPATIENT)
Dept: FAMILY MEDICINE CLINIC | Age: 40
End: 2025-05-14
Payer: COMMERCIAL

## 2025-05-14 VITALS
DIASTOLIC BLOOD PRESSURE: 72 MMHG | WEIGHT: 239 LBS | OXYGEN SATURATION: 96 % | BODY MASS INDEX: 33.33 KG/M2 | SYSTOLIC BLOOD PRESSURE: 132 MMHG | HEART RATE: 90 BPM

## 2025-05-14 DIAGNOSIS — S93.492A SPRAIN OF ANTERIOR TALOFIBULAR LIGAMENT OF LEFT ANKLE, INITIAL ENCOUNTER: Primary | ICD-10-CM
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
## 2025-05-14 DIAGNOSIS — S22.088A OTHER CLOSED FRACTURE OF ELEVENTH THORACIC VERTEBRA, INITIAL ENCOUNTER (HCC): ICD-10-CM

## 2025-05-14 PROCEDURE — 99213 OFFICE O/P EST LOW 20 MIN: CPT | Performed by: FAMILY MEDICINE

## 2025-05-14 PROCEDURE — 1036F TOBACCO NON-USER: CPT | Performed by: FAMILY MEDICINE

## 2025-05-14 PROCEDURE — G8427 DOCREV CUR MEDS BY ELIG CLIN: HCPCS | Performed by: FAMILY MEDICINE

## 2025-05-14 PROCEDURE — G2211 COMPLEX E/M VISIT ADD ON: HCPCS | Performed by: FAMILY MEDICINE

## 2025-05-14 PROCEDURE — G8417 CALC BMI ABV UP PARAM F/U: HCPCS | Performed by: FAMILY MEDICINE

## 2025-05-14 RX ORDER — FLUTICASONE PROPIONATE AND SALMETEROL 250; 50 UG/1; UG/1
1 POWDER RESPIRATORY (INHALATION) EVERY 12 HOURS
Qty: 180 EACH | Refills: 3 | Status: SHIPPED | OUTPATIENT
Start: 2025-05-14
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
